# Patient Record
Sex: MALE | Race: OTHER | HISPANIC OR LATINO | URBAN - METROPOLITAN AREA
[De-identification: names, ages, dates, MRNs, and addresses within clinical notes are randomized per-mention and may not be internally consistent; named-entity substitution may affect disease eponyms.]

---

## 2020-08-05 ENCOUNTER — EMERGENCY (EMERGENCY)
Facility: HOSPITAL | Age: 25
LOS: 0 days | Discharge: HOME | End: 2020-08-05
Attending: EMERGENCY MEDICINE | Admitting: EMERGENCY MEDICINE
Payer: MEDICAID

## 2020-08-05 VITALS
SYSTOLIC BLOOD PRESSURE: 128 MMHG | OXYGEN SATURATION: 99 % | RESPIRATION RATE: 18 BRPM | DIASTOLIC BLOOD PRESSURE: 83 MMHG | HEART RATE: 51 BPM | TEMPERATURE: 99 F

## 2020-08-05 DIAGNOSIS — K08.89 OTHER SPECIFIED DISORDERS OF TEETH AND SUPPORTING STRUCTURES: ICD-10-CM

## 2020-08-05 PROCEDURE — 99282 EMERGENCY DEPT VISIT SF MDM: CPT

## 2020-08-05 NOTE — CONSULT NOTE ADULT - SUBJECTIVE AND OBJECTIVE BOX
Patient is a 25y old Male who presents with a chief complaint of tooth pain on the upper left.    HPI: Patient said he has been having pain on top and bottom (pointed to #12 and #21 - opposing teeth). He also said that he knows he has to get his wisdom tooth extracted (#16) and has an appointment set up with his general dentist for a comprehensive exam in 2 weeks.      PAST MEDICAL & SURGICAL HISTORY:  No pertinent past medical history    MEDICATIONS  (STANDING): None    MEDICATIONS  (PRN): None      Allergies    No Known Allergies    *SOCIAL HISTORY: ( -  ) Tobacco; ( -  ) ETOH    Vital Signs Last 24 Hrs  T(C): 37.1 (05 Aug 2020 10:37), Max: 37.1 (05 Aug 2020 10:37)  T(F): 98.8 (05 Aug 2020 10:37), Max: 98.8 (05 Aug 2020 10:37)  HR: 51 (05 Aug 2020 10:37) (51 - 51)  BP: 128/83 (05 Aug 2020 10:37) (128/83 - 128/83)  BP(mean): --  RR: 18 (05 Aug 2020 10:37) (18 - 18)  SpO2: 99% (05 Aug 2020 10:37) (99% - 99%)    EOE:  TMJ ( - ) clicks                     ( -  ) pops                     ( -  ) crepitus             Mandible <<FROM>>             Facial bones and MOM <<grossly intact>>             ( -  ) trismus             ( -  ) lymphadenopathy             ( -  ) swelling             ( -  ) asymmetry             ( -  ) palpation             ( -  ) dyspnea             ( -  ) dysphagia             ( -  ) loss of consciousness    IOE:  permanent dentition: grossly intact           hard/soft palate:  ( - ) palatal torus, <<No pathology noted>>           tongue/FOM <<No pathology noted>>           labial/buccal mucosa <<No pathology noted>>           ( +  ) percussion - #16           ( +  ) palpation - #16           ( -  ) swelling            ( -  ) abscess           ( -  ) sinus tract    Dentition present: permanent dentition  Caries: large decay on #16    *DENTAL RADIOGRAPHS: 2 periapical radiographs taken - of #12 and of #16    *ASSESSMENT: Non-restorable #16 that is causing referred pain to #12 as well as below    *PLAN: Extraction of #16    PROCEDURE:   All risks and benefits of extraction discussed as per OS dated 7/13/00. Consent obtained. Side site marked. Administered 1.5 carpules of 4% septocaine with 1:100,000 epinephrine. [place] of tooth was completely decayed and came off. Tooth was entirely under soft tissue. Used periosteal elevator, east west elevator and forceps to extract. Post-operative radiograph taken. Hemostasis achieved. Post-operative instructions given. Prescribed amoxicillin 500mg for 7 days and Ibuprofen 600mg as needed.      RECOMMENDATIONS:  1) Take prescribed medication as indicated  2) Dental F/U with outpatient dentist for comprehensive dental care.   3) If any difficulty swallowing/breathing, fever occur, return to ER.

## 2020-08-05 NOTE — ED PROVIDER NOTE - ENMT, MLM
Airway patent, Nasal mucosa clear. Mouth with normal mucosa. Throat has no vesicles, no oropharyngeal exudates and uvula is midline.  (+) tenderness to percussion tooth #12

## 2020-08-05 NOTE — ED PROVIDER NOTE - OBJECTIVE STATEMENT
25 y.o. male presented to the ER c/o Left upper tooth ache for the past month.  Pt denies fever, chills, dysphagia, SOB. No other complaints.

## 2024-06-28 NOTE — ED ADULT NURSE NOTE - CHIEF COMPLAINT QUOTE
"one of my teeth are hurting really bad" Dr. Back,    Please see request below and advise.    Last OV 02/06/24 and recommended return in about 4 weeks (around 3/5/2024 .    Thank you  Mateo DAWKINS RN  [facility]